# Patient Record
Sex: FEMALE | Race: WHITE | NOT HISPANIC OR LATINO | Employment: PART TIME | URBAN - METROPOLITAN AREA
[De-identification: names, ages, dates, MRNs, and addresses within clinical notes are randomized per-mention and may not be internally consistent; named-entity substitution may affect disease eponyms.]

---

## 2017-11-25 ENCOUNTER — APPOINTMENT (OUTPATIENT)
Dept: LAB | Facility: HOSPITAL | Age: 32
End: 2017-11-25
Payer: COMMERCIAL

## 2017-11-25 ENCOUNTER — OFFICE VISIT (OUTPATIENT)
Dept: URGENT CARE | Facility: CLINIC | Age: 32
End: 2017-11-25
Payer: COMMERCIAL

## 2017-11-25 DIAGNOSIS — B34.9 VIRAL INFECTION: ICD-10-CM

## 2017-11-25 PROCEDURE — S9088 SERVICES PROVIDED IN URGENT: HCPCS

## 2017-11-25 PROCEDURE — 99203 OFFICE O/P NEW LOW 30 MIN: CPT

## 2017-11-25 PROCEDURE — 87798 DETECT AGENT NOS DNA AMP: CPT

## 2017-11-26 LAB
FLUAV AG SPEC QL: NORMAL
FLUBV AG SPEC QL: NORMAL
RSV B RNA SPEC QL NAA+PROBE: NORMAL

## 2017-11-27 NOTE — PROGRESS NOTES
Assessment    1  Viral syndrome (091 99) (B34 9)  2  Nausea (437 02) (R11 0)    Plan  Nausea    · Start: Ondansetron 4 MG Oral Tablet Disintegrating; TAKE 4 MG Every 8 hours PRN Asneeded for nausea  Viral syndrome    · Start: Benzonatate 200 MG Oral Capsule; TAKE 1 CAPSULE 2-3 TIMES DAILY   · Start: Bromfed DM 30-2-10 MG/5ML Oral Syrup; SWALLOW 10 ML Every 6 hours PRN Forcough/congestion   · (1) INFLUENZA A/B AND RSV, PCR; Status:Active - Retrospective By ProtocolAuthorization; Requested for:25Nov2017;     Discussion/Summary  Discussion Summary:   Take medications as directed  Increase fluids and rest  Warm saltwater gargles, hot tea with honey and lemon, throat lozenges, Chloraseptic spray as needed for sore throat relief  Tylenol and Advil as needed for fever and chills  Await culture results from Nasal culture  Monitor for severe worsening of current symptoms, difficulty breathing, swallowing, uncontrollable fever or chills  With these symptoms follow up with PCP or ER immediately  Otherwise follow-up with PCP in 3-5 days if symptoms are not improving  Medication Side Effects Reviewed: Possible side effects of new medications were reviewed with the patient/guardian today  Understands and agrees with treatment plan: The treatment plan was reviewed with the patient/guardian  The patient/guardian understands and agrees with the treatment plan   Counseling Documentation With Imm: The patient was counseled regarding instructions for management  Follow Up Instructions: Follow Up with your Primary Care Provider in 3-5 days  If your symptoms worsen, go to the nearest [place] [address] Emergency Department  Chief Complaint    1  Cough  Chief Complaint Free Text Note Form: Cough and congestion with achiness started Tuesday      History of Present Illness  HPI: 62-year-old female with a complaint of cough, congestion, body aches, malaise, fevers, chills times 4 days  She takes the cough has been nonproductive   Her symptoms got progressively worse over the past couple days  She is definitely having a lot of fatigue and body aches  She did receive her flu shot this past season  She denies any ear pain, nausea/vomiting, abdominal pain  She has no history of abdominal surgeries  She has noticed a decrease in appetite  She is still able to hold down liquids no problem  she has been feeling a lot of nausea recently and is unable to eat because of that  She has not had any vomiting episodes  She denies any chest pain, shortness of breath, difficulty breathing  Hospital Based Practices Required Assessment:  Pain Assessment  the patient states they do not have pain  Abuse And Domestic Violence Screen   Yes, the patient is safe at home  -- The patient states no one is hurting them  Depression And Suicide Screen  No, the patient has not had thoughts of hurting themself  No, the patient has not felt depressed in the past 7 days  Review of Systems  Focused-Female:  Constitutional: fever-- and-- chills  Cardiovascular: no chest pain-- and-- no palpitations  Respiratory: cough, but-- no shortness of breath,-- no orthopnea-- and-- no wheezing  Gastrointestinal: nausea, but-- no abdominal pain,-- no vomiting-- and-- no diarrhea  ROS Reviewed:   ROS reviewed  Past Medical History  1  No pertinent past medical history  Active Problems And Past Medical History Reviewed: The active problems and past medical history were reviewed and updated today  Family History  Family History Reviewed: The family history was reviewed and updated today  Social History   · Never a smoker  Social History Reviewed: The social history was reviewed and updated today  The social history was reviewed and is unchanged  Surgical History    1  History of Incision And Drainage Of Pilonidal Cyst, Complicated  Surgical History Reviewed: The surgical history was reviewed and updated today  Current Meds  1  Mucinex TBCR;  Therapy: (Recorded:25Nov2017) to Recorded  Medication List Reviewed: The medication list was reviewed and updated today  Allergies    1  No Known Drug Allergies    Vitals  Signs   Recorded: 25Nov2017 10:47AM   Temperature: 98 1 F  Heart Rate: 76  Respiration: 20  Systolic: 969  Diastolic: 58  Height: 5 ft 2 in  Weight: 258 lb   BMI Calculated: 47 19  BSA Calculated: 2 13  O2 Saturation: 96    Physical Exam   Constitutional  General appearance: No acute distress, well appearing and well nourished  Eyes  Conjunctiva and lids: No swelling, erythema or discharge  Pupils and irises: Equal, round and reactive to light  Ears, Nose, Mouth, and Throat  External inspection of ears and nose: Normal    Otoscopic examination: Tympanic membranes translucent with normal light reflex  Canals patent without erythema  Nasal mucosa, septum, and turbinates: Normal without edema or erythema  Oropharynx: Abnormal  -- Erythema in posterior oropharynx without exudates or edema  Pulmonary  Respiratory effort: No increased work of breathing or signs of respiratory distress  Auscultation of lungs: Clear to auscultation  Cardiovascular  Palpation of heart: Normal PMI, no thrills  Auscultation of heart: Normal rate and rhythm, normal S1 and S2, without murmurs  Abdomen  Abdomen: Non-tender, no masses  Lymphatic  Palpation of lymph nodes in neck: Abnormal  -- bilateral cervical lymphadenopathy  Musculoskeletal  Gait and station: Normal    Psychiatric  Orientation to person, place, and time: Normal    Mood and affect: Normal        Message  Return to work or school:   José Manuel Bueno is under my professional care  She was seen in my office on Saturday, 11/25/2017   She is able to return to work on  Tuesday, 11/28/2017    She is able to return to school on Tuesday, 11/28/2017    No restrictions  Kevin Long PA-C        Signatures   Electronically signed by : Kevin Long, [facility]; Nov 25 2017 11:29AM EST (Author)    Electronically signed by : CLOVIS Mooney ; Nov 26 2017  8:27AM EST

## 2018-01-18 NOTE — MISCELLANEOUS
Message  Return to work or school:   José Manuel Bueno is under my professional care  She was seen in my office on Saturday, 11/25/2017   She is able to return to work on  Tuesday, 11/28/2017    She is able to return to school on Tuesday, 11/28/2017    No restrictions  Kevin Long PA-C        Signatures   Electronically signed by : Kevin Long [facility]; Nov 25 2017 11:29AM EST                       (Author)    Electronically signed by : Kevin Long, [facility]; Nov 26 2017  8:08AM EST                       (Author)

## 2020-08-03 ENCOUNTER — OFFICE VISIT (OUTPATIENT)
Dept: URGENT CARE | Facility: CLINIC | Age: 35
End: 2020-08-03
Payer: COMMERCIAL

## 2020-08-03 VITALS — HEIGHT: 62 IN

## 2020-08-03 DIAGNOSIS — Z20.822 ENCOUNTER FOR LABORATORY TESTING FOR COVID-19 VIRUS: Primary | ICD-10-CM

## 2020-08-03 PROCEDURE — 99213 OFFICE O/P EST LOW 20 MIN: CPT | Performed by: EMERGENCY MEDICINE

## 2020-08-03 PROCEDURE — S9088 SERVICES PROVIDED IN URGENT: HCPCS | Performed by: EMERGENCY MEDICINE

## 2020-08-03 PROCEDURE — U0003 INFECTIOUS AGENT DETECTION BY NUCLEIC ACID (DNA OR RNA); SEVERE ACUTE RESPIRATORY SYNDROME CORONAVIRUS 2 (SARS-COV-2) (CORONAVIRUS DISEASE [COVID-19]), AMPLIFIED PROBE TECHNIQUE, MAKING USE OF HIGH THROUGHPUT TECHNOLOGIES AS DESCRIBED BY CMS-2020-01-R: HCPCS | Performed by: EMERGENCY MEDICINE

## 2020-08-03 RX ORDER — NORETHINDRONE ACETATE AND ETHINYL ESTRADIOL AND FERROUS FUMARATE 1MG-20(21)
1 KIT ORAL DAILY
COMMUNITY
Start: 2020-05-24

## 2020-08-03 RX ORDER — CITALOPRAM 40 MG/1
40 TABLET ORAL DAILY
COMMUNITY
Start: 2020-06-09

## 2020-08-03 RX ORDER — LEVOCETIRIZINE DIHYDROCHLORIDE 5 MG/1
5 TABLET, FILM COATED ORAL EVERY EVENING
COMMUNITY
Start: 2020-05-30

## 2020-08-03 RX ORDER — PANTOPRAZOLE SODIUM 40 MG/1
40 TABLET, DELAYED RELEASE ORAL DAILY
COMMUNITY
Start: 2020-06-05

## 2020-08-03 RX ORDER — TRAZODONE HYDROCHLORIDE 100 MG/1
100 TABLET ORAL
COMMUNITY
Start: 2020-05-02

## 2020-08-03 RX ORDER — MONTELUKAST SODIUM 10 MG/1
10 TABLET ORAL DAILY
COMMUNITY
Start: 2020-05-29

## 2020-08-03 NOTE — PROGRESS NOTES
[facility]        NAME: Minnie Garcia is a 37 y o  female  : 1985    MRN: [id number]  DATE: August 3, 2020  TIME: 7:51 PM    Assessment and Plan   Encounter for laboratory testing for COVID-19 virus [Z11 59]  1  Encounter for laboratory testing for COVID-19 virus           Patient Instructions     There are no Patient Instructions on file for this visit  Follow up with PCP in 3-5 days  Proceed to  ER if symptoms worsen  Chief Complaint     Chief Complaint   Patient presents with    COVID-19     Possible exposure to COVID  Denies symptoms  Denies recent travel outside the state  History of Present Illness       Patient because COVID testing after recent out of state travel  She has no symptoms  Review of Systems   Review of Systems   Constitutional: Negative for chills and fever  HENT: Negative for congestion, rhinorrhea, sinus pressure, sore throat, trouble swallowing and voice change  Respiratory: Negative for cough, chest tightness, shortness of breath and wheezing  Cardiovascular: Negative for chest pain  Neurological: Negative for headaches  Current Medications     No current outpatient medications on file  Current Allergies     Allergies as of 2020    (No Known Allergies)            The following portions of the patient's history were reviewed and updated as appropriate: allergies, current medications, past family history, past medical history, past social history, past surgical history and problem list      No past medical history on file  No past surgical history on file  No family history on file  Medications have been verified  Objective   Ht 5' 2"   LMP 2020 (Exact Date)   Breastfeeding No        Physical Exam     Physical Exam   Constitutional: She is oriented to person, place, and time  She appears well-developed  No distress  HENT:   Head: Normocephalic and atraumatic     Right Ear: Tympanic membrane normal  Left Ear: Tympanic membrane normal    Nose: Mucosal edema present  Mouth/Throat: Posterior oropharyngeal erythema present  No oropharyngeal exudate or tonsillar abscesses  Neck: Neck supple  Pulmonary/Chest: Effort normal  No respiratory distress  She has no wheezes  She has no rales  Neurological: She is alert and oriented to person, place, and time  Skin: Skin is warm and dry  Psychiatric: Her behavior is normal  Judgment and thought content normal    Nursing note and vitals reviewed

## 2020-08-03 NOTE — PATIENT INSTRUCTIONS
[address]     Your healthcare provider and/or public health staff have evaluated you and have determined that you do not need to be hospitalized at this time  At this time you can be isolated at home where you will be monitored by staff from your local or state health department  You should carefully follow the prevention and isolation steps below until a healthcare provider or local or state health department says that you can return to your normal activities  Stay home except to get medical care     People who are mildly ill with COVID-19 are able to isolate at home during their illness  You should restrict activities outside your home, except for getting medical care  Do not go to work, school, or public areas  Avoid using public transportation, ride-sharing, or taxis  Separate yourself from other people and animals in your home     People: As much as possible, you should stay in a specific room and away from other people in your home  Also, you should use a separate bathroom, if available  Animals: You should restrict contact with pets and other animals while you are sick with COVID-19, just like you would around other people  Although there have not been reports of pets or other animals becoming sick with COVID-19, it is still recommended that people sick with COVID-19 limit contact with animals until more information is known about the virus  When possible, have another member of your household care for your animals while you are sick  If you are sick with COVID-19, avoid contact with your pet, including petting, snuggling, being kissed or licked, and sharing food  If you must care for your pet or be around animals while you are sick, wash your hands before and after you interact with pets and wear a facemask  See COVID-19 and Animals for more information       Call ahead before visiting your doctor     If you have a medical appointment, call the healthcare provider and tell them that you have or may have COVID-19  This will help the healthcare providers office take steps to keep other people from getting infected or exposed  Wear a facemask     You should wear a facemask when you are around other people (e g , sharing a room or vehicle) or pets and before you enter a healthcare providers office  If you are not able to wear a facemask (for example, because it causes trouble breathing), then people who live with you should not stay in the same room with you, or they should wear a facemask if they enter your room  Cover your coughs and sneezes     Cover your mouth and nose with a tissue when you cough or sneeze  Throw used tissues in a lined trash can  Immediately wash your hands with soap and water for at least 20 seconds or, if soap and water are not available, clean your hands with an alcohol-based hand  that contains at least 60% alcohol  Clean your hands often     Wash your hands often with soap and water for at least 20 seconds, especially after blowing your nose, coughing, or sneezing; going to the bathroom; and before eating or preparing food  If soap and water are not readily available, use an alcohol-based hand  with at least 60% alcohol, covering all surfaces of your hands and rubbing them together until they feel dry  Soap and water are the best option if hands are visibly dirty  Avoid touching your eyes, nose, and mouth with unwashed hands  Avoid sharing personal household items     You should not share dishes, drinking glasses, cups, eating utensils, towels, or bedding with other people or pets in your home  After using these items, they should be washed thoroughly with soap and water  Clean all high-touch surfaces everyday     High touch surfaces include counters, tabletops, doorknobs, bathroom fixtures, toilets, phones, keyboards, tablets, and bedside tables   Also, clean any surfaces that may have blood, stool, or body fluids on them  Use a household cleaning spray or wipe, according to the label instructions  Labels contain instructions for safe and effective use of the cleaning product including precautions you should take when applying the product, such as wearing gloves and making sure you have good ventilation during use of the product  Monitor your symptoms     Seek prompt medical attention if your illness is worsening (e g , difficulty breathing)  Before seeking care, call your healthcare provider and tell them that you have, or are being evaluated for, COVID-19  Put on a facemask before you enter the facility  These steps will help the healthcare providers office to keep other people in the office or waiting room from getting infected or exposed  Ask your healthcare provider to call the local or state health department  Persons who are placed under active monitoring or facilitated self-monitoring should follow instructions provided by their local health department or occupational health professionals, as appropriate  If you have a medical emergency and need to call 911, notify the dispatch personnel that you have, or are being evaluated for COVID-19  If possible, put on a facemask before emergency medical services arrive  Discontinuing home isolation     Patients with confirmed COVID-19 should remain under home isolation precautions until the risk of secondary transmission to others is thought to be low  The decision to discontinue home isolation precautions should be made on a case-by-case basis, in consultation with healthcare providers and state and local health departments  Source: [employer]      Proceed to ER if symptoms worsen

## 2020-08-05 LAB — SARS-COV-2 RNA SPEC QL NAA+PROBE: NOT DETECTED

## 2020-12-06 ENCOUNTER — OFFICE VISIT (OUTPATIENT)
Dept: URGENT CARE | Facility: CLINIC | Age: 35
End: 2020-12-06
Payer: COMMERCIAL

## 2020-12-06 VITALS — BODY MASS INDEX: 47.84 KG/M2 | WEIGHT: 260 LBS | HEIGHT: 62 IN

## 2020-12-06 DIAGNOSIS — R43.2 LOSS OF TASTE: ICD-10-CM

## 2020-12-06 DIAGNOSIS — R68.89 FLU-LIKE SYMPTOMS: Primary | ICD-10-CM

## 2020-12-06 PROCEDURE — 99213 OFFICE O/P EST LOW 20 MIN: CPT | Performed by: PHYSICIAN ASSISTANT

## 2020-12-06 PROCEDURE — 87637 SARSCOV2&INF A&B&RSV AMP PRB: CPT | Performed by: PHYSICIAN ASSISTANT

## 2020-12-06 PROCEDURE — S9088 SERVICES PROVIDED IN URGENT: HCPCS | Performed by: PHYSICIAN ASSISTANT

## 2020-12-09 LAB
FLUAV RNA NPH QL NAA+PROBE: NOT DETECTED
FLUBV RNA NPH QL NAA+PROBE: NOT DETECTED
RSV RNA NPH QL NAA+PROBE: NOT DETECTED
SARS-COV-2 RNA NPH QL NAA+PROBE: DETECTED